# Patient Record
(demographics unavailable — no encounter records)

---

## 2024-10-31 NOTE — REVIEW OF SYSTEMS
Scheduled as requested   [Right] : right [Negative] : Allergic/Immunologic [de-identified] : see HPI

## 2024-10-31 NOTE — CONSULT LETTER
[Dear  ___] : Dear  [unfilled], [Consult Letter:] : I had the pleasure of evaluating your patient, [unfilled]. [Please see my note below.] : Please see my note below. [Consult Closing:] : Thank you very much for allowing me to participate in the care of this patient.  If you have any questions, please do not hesitate to contact me. [Sincerely,] : Sincerely, [FreeTextEntry3] : Brain Dallas M.D. Surgery of the Hand & Upper Extremity Orthopaedic Surgery Chief, Hand Service, [facility]  of Orthopedic Surgery, [facility] at hospitals/[facility]Email: [email or web address] Office Phone: [phone]

## 2024-10-31 NOTE — CONSULT LETTER
[Dear  ___] : Dear  [unfilled], [Consult Letter:] : I had the pleasure of evaluating your patient, [unfilled]. [Please see my note below.] : Please see my note below. [Consult Closing:] : Thank you very much for allowing me to participate in the care of this patient.  If you have any questions, please do not hesitate to contact me. [Sincerely,] : Sincerely, [FreeTextEntry3] : Brain Dallas M.D. Surgery of the Hand & Upper Extremity Orthopaedic Surgery Chief, Hand Service, [facility]  of Orthopedic Surgery, [facility] [employer] at [facility]Email: [email or web address] Office Phone: [phone]

## 2024-10-31 NOTE — END OF VISIT
[FreeTextEntry3] : This note was written by Remy Lombardi on 10/29/2024 acting solely as a scribe for Dr. Brain Dallas.   All medical record entries made by the Scribe were at my, Dr. Brain Dallas, direction and personally dictated by me on 10/29/2024. I have personally reviewed the chart and agree that the record accurately reflects my personal performance of the history, physical exam, assessment and plan.

## 2024-10-31 NOTE — PHYSICAL EXAM
[de-identified] : - Constitutional: This is a female in no obvious distress.   - Psych: Patient is alert and oriented to person, place and time.  Patient has a normal mood and affect.  - Cardiovascular: Normal pulses throughout the upper extremities.  No significant varicosities are noted in the upper extremities.  - Neuro: Strength and sensation are intact throughout the upper extremities.  Patient has normal coordination.  - Respiratory:  Patient exhibits no evidence of shortness of breath or difficulty breathing.  - Skin: No rashes, lesions, or other abnormalities are noted in the upper extremities.  --- Examination of her right hand after the splint was removed demonstrates mild swelling and tenderness along the base of the fourth and fifth metacarpal.  There is no rotational deformity.  She has limitation of flexion and extension digits secondary to the splint.  She is neurovascularly intact distally.            [de-identified] : I reviewed x-rays of the right hand dated 10/24/2024 which demonstrate minimally displaced right fifth metacarpal base fracture and possible nondisplaced right fourth metacarpal base fracture.

## 2024-10-31 NOTE — ADDENDUM
[FreeTextEntry1] :  I, Remy Lombardi, acted solely as a scribe for Dr. Dallas on this date on 10/29/2024.

## 2024-10-31 NOTE — DISCUSSION/SUMMARY
Vitals capture stopped. [FreeTextEntry1] :  She has findings consistent with a minimally displaced right fifth metacarpal base fracture and possible nondisplaced right fourth metacarpal base fracture after a fall 17 days ago.  These are stable fractures.  I had a discussion with the patient regarding today's visit, the prognosis of this diagnosis, and treatment recommendations and options. At this time, I recommended splinting and activity modification. A right carpal tunnel splint was provided today. She will follow-up in 2 weeks.  The patient has agreed to the above plan of management and has expressed full understanding.  All questions were fully answered to the patient's satisfaction.  My cumulative time spent on this visit included: Preparation for the visit, review of the medical records, review of pertinent diagnostic studies, examination and counseling of the patient on the above diagnosis, treatment plan and prognosis, orders of diagnostic tests, medication and/or appropriate procedures and documentation in the medical records of today's visit.

## 2024-10-31 NOTE — HISTORY OF PRESENT ILLNESS
[FreeTextEntry1] : 30 days status post fall resulting in minimally displaced right fifth metacarpal base fracture and possible nondisplaced right fourth metacarpal base fracture.  See note from when she was seen in the office 13 days ago.  She was splinted.  She is  She has Type 2 diabetes

## 2024-10-31 NOTE — PHYSICAL EXAM
[de-identified] : - Constitutional: This is a female in no obvious distress.   - Psych: Patient is alert and oriented to person, place and time.  Patient has a normal mood and affect.  - Cardiovascular: Normal pulses throughout the upper extremities.  No significant varicosities are noted in the upper extremities.  - Neuro: Strength and sensation are intact throughout the upper extremities.  Patient has normal coordination.  - Respiratory:  Patient exhibits no evidence of shortness of breath or difficulty breathing.  - Skin: No rashes, lesions, or other abnormalities are noted in the upper extremities.  --- Examination of her right hand after the splint was removed demonstrates mild swelling and tenderness along the base of the fourth and fifth metacarpal.  There is no rotational deformity.  She has limitation of flexion and extension digits secondary to the splint.  She is neurovascularly intact distally.            [de-identified] : I reviewed x-rays of the right hand dated 10/24/2024 which demonstrate minimally displaced right fifth metacarpal base fracture and possible nondisplaced right fourth metacarpal base fracture.

## 2024-10-31 NOTE — DISCUSSION/SUMMARY
[FreeTextEntry1] :  She has findings consistent with a minimally displaced right fifth metacarpal base fracture and possible nondisplaced right fourth metacarpal base fracture after a fall 17 days ago.  These are stable fractures.  I had a discussion with the patient regarding today's visit, the prognosis of this diagnosis, and treatment recommendations and options. At this time, I recommended splinting and activity modification. A right carpal tunnel splint was provided today. She will follow-up in 2 weeks.  The patient has agreed to the above plan of management and has expressed full understanding.  All questions were fully answered to the patient's satisfaction.  My cumulative time spent on this visit included: Preparation for the visit, review of the medical records, review of pertinent diagnostic studies, examination and counseling of the patient on the above diagnosis, treatment plan and prognosis, orders of diagnostic tests, medication and/or appropriate procedures and documentation in the medical records of today's visit.

## 2024-10-31 NOTE — HISTORY OF PRESENT ILLNESS
[Right] : right hand dominant [FreeTextEntry1] : She comes in today for evaluation of a right little finger fracture as a result of a fall 17 days ago. She had x-rays done at [facility] as well as [facility]. She reports weakness and swelling. She rates her pain as a 4/10.   She has Type 2 diabetes  She was referred by Dr. Dunn.

## 2024-10-31 NOTE — PHYSICAL EXAM
[de-identified] : - Constitutional: This is a female in no obvious distress.   - Psych: Patient is alert and oriented to person, place and time.  Patient has a normal mood and affect.  - Cardiovascular: Normal pulses throughout the upper extremities.  No significant varicosities are noted in the upper extremities.  - Neuro: Strength and sensation are intact throughout the upper extremities.  Patient has normal coordination.  - Respiratory:  Patient exhibits no evidence of shortness of breath or difficulty breathing.  - Skin: No rashes, lesions, or other abnormalities are noted in the upper extremities.  --- Examination of her right hand after the splint was removed demonstrates mild swelling and tenderness along the base of the fourth and fifth metacarpal.  There is no rotational deformity.  She has limitation of flexion and extension digits secondary to the splint.  She is neurovascularly intact distally.            [de-identified] : I reviewed x-rays of the right hand dated 10/24/2024 which demonstrate minimally displaced right fifth metacarpal base fracture and possible nondisplaced right fourth metacarpal base fracture.

## 2024-10-31 NOTE — HISTORY OF PRESENT ILLNESS
[Right] : right hand dominant [FreeTextEntry1] : She comes in today for evaluation of a right little finger fracture as a result of a fall 17 days ago. She had x-rays done at [facility] as well as [facility]. She reports weakness and swelling. She rates her pain as a 4/10.   She has Type 2 diabetes  She was referred by Dr. Dunn.

## 2024-11-04 NOTE — PHYSICAL EXAM
[de-identified] : - Constitutional: This is a female in no obvious distress.   - Psych: Patient is alert and oriented to person, place and time.  Patient has a normal mood and affect.  - Cardiovascular: Normal pulses throughout the upper extremities.  No significant varicosities are noted in the upper extremities.  - Neuro: Strength and sensation are intact throughout the upper extremities.  Patient has normal coordination.  - Respiratory:  Patient exhibits no evidence of shortness of breath or difficulty breathing.  - Skin: No rashes, lesions, or other abnormalities are noted in the upper extremities.  --- Examination of her right hand after the splint was removed demonstrates mild swelling and tenderness along the base of the fourth and fifth metacarpal.  There is no rotational deformity.  She has limitation of flexion and extension digits secondary to the splint.  She is neurovascularly intact distally.            [de-identified] : I reviewed x-rays of the right hand dated 10/24/2024 which demonstrate minimally displaced right fifth metacarpal base fracture and possible nondisplaced right fourth metacarpal base fracture.